# Patient Record
Sex: MALE | Race: BLACK OR AFRICAN AMERICAN | NOT HISPANIC OR LATINO | Employment: OTHER | URBAN - METROPOLITAN AREA
[De-identification: names, ages, dates, MRNs, and addresses within clinical notes are randomized per-mention and may not be internally consistent; named-entity substitution may affect disease eponyms.]

---

## 2018-09-26 ENCOUNTER — HISTORICAL (OUTPATIENT)
Dept: LAB | Facility: HOSPITAL | Age: 53
End: 2018-09-26

## 2018-09-26 ENCOUNTER — HISTORICAL (OUTPATIENT)
Dept: PREADMISSION TESTING | Facility: HOSPITAL | Age: 53
End: 2018-09-26

## 2018-09-26 LAB
ABS NEUT (OLG): 4.77 X10(3)/MCL (ref 2.1–9.2)
ALBUMIN SERPL-MCNC: 3.6 GM/DL (ref 3.4–5)
ALBUMIN/GLOB SERPL: 1.1 RATIO (ref 1.1–2)
ALP SERPL-CCNC: 62 UNIT/L (ref 50–136)
ALT SERPL-CCNC: 24 UNIT/L (ref 12–78)
ANISOCYTOSIS BLD QL SMEAR: 1
AST SERPL-CCNC: 20 UNIT/L (ref 15–37)
BASOPHILS # BLD AUTO: 0 X10(3)/MCL (ref 0–0.2)
BASOPHILS NFR BLD AUTO: 0 %
BILIRUB SERPL-MCNC: 0.6 MG/DL (ref 0.2–1)
BILIRUBIN DIRECT+TOT PNL SERPL-MCNC: 0.2 MG/DL (ref 0–0.5)
BILIRUBIN DIRECT+TOT PNL SERPL-MCNC: 0.4 MG/DL (ref 0–0.8)
BUN SERPL-MCNC: 16 MG/DL (ref 7–18)
BURR CELLS BLD QL SMEAR: 2
CALCIUM SERPL-MCNC: 8.7 MG/DL (ref 8.5–10.1)
CHLORIDE SERPL-SCNC: 104 MMOL/L (ref 98–107)
CO2 SERPL-SCNC: 30 MMOL/L (ref 21–32)
CREAT SERPL-MCNC: 1.29 MG/DL (ref 0.7–1.3)
EOSINOPHIL # BLD AUTO: 0.1 X10(3)/MCL (ref 0–0.9)
EOSINOPHIL NFR BLD AUTO: 2 %
ERYTHROCYTE [DISTWIDTH] IN BLOOD BY AUTOMATED COUNT: 14.9 % (ref 11.5–17)
GLOBULIN SER-MCNC: 3.2 GM/DL (ref 2.4–3.5)
GLUCOSE SERPL-MCNC: 41 MG/DL (ref 74–106)
HCT VFR BLD AUTO: 45.9 % (ref 42–52)
HGB BLD-MCNC: 13.7 GM/DL (ref 14–18)
LYMPHOCYTES # BLD AUTO: 0.8 X10(3)/MCL (ref 0.6–4.6)
LYMPHOCYTES NFR BLD AUTO: 11 %
MCH RBC QN AUTO: 25.8 PG (ref 27–31)
MCHC RBC AUTO-ENTMCNC: 29.8 GM/DL (ref 33–36)
MCV RBC AUTO: 86.3 FL (ref 80–94)
MICROCYTES BLD QL SMEAR: 1
MONOCYTES # BLD AUTO: 0.9 X10(3)/MCL (ref 0.1–1.3)
MONOCYTES NFR BLD AUTO: 14 %
NEUTROPHILS # BLD AUTO: 4.77 X10(3)/MCL (ref 2.1–9.2)
NEUTROPHILS NFR BLD AUTO: 69 %
PLATELET # BLD AUTO: 185 X10(3)/MCL (ref 130–400)
PLATELET # BLD EST: NORMAL 10*3/UL
PMV BLD AUTO: 10.5 FL (ref 9.4–12.4)
POIKILOCYTOSIS BLD QL SMEAR: 2
POTASSIUM SERPL-SCNC: 4.6 MMOL/L (ref 3.5–5.1)
PROT SERPL-MCNC: 6.8 GM/DL (ref 6.4–8.2)
RBC # BLD AUTO: 5.32 X10(6)/MCL (ref 4.7–6.1)
RBC MORPH BLD: NORMAL
SODIUM SERPL-SCNC: 140 MMOL/L (ref 136–145)
WBC # SPEC AUTO: 6.9 X10(3)/MCL (ref 4.5–11.5)

## 2018-10-03 ENCOUNTER — HISTORICAL (OUTPATIENT)
Dept: SURGERY | Facility: HOSPITAL | Age: 53
End: 2018-10-03

## 2021-05-12 ENCOUNTER — PATIENT MESSAGE (OUTPATIENT)
Dept: RESEARCH | Facility: HOSPITAL | Age: 56
End: 2021-05-12

## 2021-06-08 ENCOUNTER — HISTORICAL (OUTPATIENT)
Dept: ADMINISTRATIVE | Facility: HOSPITAL | Age: 56
End: 2021-06-08

## 2021-08-12 ENCOUNTER — HISTORICAL (OUTPATIENT)
Dept: ADMINISTRATIVE | Facility: HOSPITAL | Age: 56
End: 2021-08-12

## 2022-01-24 ENCOUNTER — HISTORICAL (OUTPATIENT)
Dept: INFECTIOUS DISEASES | Facility: HOSPITAL | Age: 57
End: 2022-01-24

## 2022-04-29 NOTE — OP NOTE
Patient:   Hank Coates            MRN: [id number]            FIN: [id number]               Age:   56 years     Sex:  Male     :  1965   Associated Diagnoses:   None   Author:   Kelsy ESPINOZA MD, Lizandro MARTINEZ      Pre-op Dx:  Cataract of the Right eye    Post-op Dx:  Cataract of the Right eye     Procedure:  Cataract extraction by phacoemulsification with an IOL    Anes:   General     Complications: None    Procedure in detail:   Dilating drops were given in the holding area.  The patient was brought into the surgical suite, identified and the correct eye confirmed.  Topical anesthesia was applied.  The eye was then prepped and draped in a sterile fashion.  A supersharp blade was used to make a paracentesis at the 11 oclock position. 1/2 cc of 2% lidocaine + 1/2 cc BSS was injected into AC thru cannula. Viscoelastic was placed in the anterior chamber.  A clear corneal incision was made at the 215 degree position with a keratome blade.  Next, a cystotome and utrata forceps were used to make a 360 degree capsulorrhexis.  The phaco handpiece was placed into the anterior chamber and the lens removed in a divide and conquer fashion.  The remaining cortex was removed with the I & A handpiece.  Viscoelastic was placed into the capsular bag, which remained clear and intact.  An  IOL was placed in the bag and rotated into position.  The remaining viscoelastic was removed with the I &A handpiece.  The incision was hydrated with BSS and checked for leakage.  No leakage was found.  The drapes were removed and antibiotic drops were placed into the eye.  The patient tolerated the procedure well and was moved back to the holding room.  Sunglasses and instructions were personally given to the patient and family.  The patient will follow-up at my office tomorrow.     EFX 19   24.5 ZCBOO IOL       Aniket Tierney II, M.D.

## 2022-04-29 NOTE — OP NOTE
Patient:   Hank Coates            MRN: [id number]            FIN: [id number]               Age:   56 years     Sex:  Male     :  1965   Associated Diagnoses:   None   Author:   Kelsy ESPINOZA MD, Lizandro MARTINEZ      Pre-op Dx:  Cataract of the Left eye    Post-op Dx:  Cataract of the Left eye     Procedure:  Cataract extraction by phacoemulsification with an IOL    Anes:   Topical    Complications: None    Procedure in detail:   Dilating drops were given in the holding area.  The patient was brought into the surgical suite, identified and the correct eye confirmed.  Topical anesthesia was applied.  The eye was then prepped and draped in a sterile fashion.  A supersharp blade was used to make a paracentesis at the 5 oclock position.  1/2 cc of 2% lidocaine + 1/2 cc BSS was injected into AC thru cannula. Viscoelastic was placed in the anterior chamber.  A clear corneal incision was made at the  3 oclock position with a keratome blade.  Next, a cystatome and utrata forceps were used to make and remove a 360 degree capsulorrhexis.  The phaco handpiece was placed into the anterior chamber and the lens removed in a divide and conquer fashion.  The remaining cortex was removed with the I & A handpiece.  Viscoelastic was placed into the capsular bag, which remained clear and intact.  An  IOL was placed in the bag and rotated into position.  The remaining viscoelastic was removed with the I &A handpiece.  The incision was hydrated with BSS and checked for leakage.  No leakage was found.  The drapes were removed and antibiotic drops were placed into the eye.  The patient tolerated the procedure well and was moved back to the holding room.  Sunglasses and instructions were personally given to the patient and family.  The patient will follow-up at my office tomorrow.      EFX 24    24.0 ZCBOO IOL        Aniket Tierney II, M.D.

## 2022-04-29 NOTE — OP NOTE
Patient:   Hank Coates            MRN: [id number]            FIN: [id number]               Age:   56 years     Sex:  Male     :  1965   Associated Diagnoses:   None   Author:   Kelsy ESPINOZA MD, Lizandro MARTINEZ      Pre-op Dx:  Cataract of the Right eye    Post-op Dx:  Cataract of the Right eye     Procedure:  Cataract extraction by phacoemulsification with an IOL    Anes:   Topical    Complications: None    Procedure in detail:   Dilating drops were given in the holding area.  The patient was brought into the surgical suite, identified and the correct eye confirmed.  Topical anesthesia was applied.  The eye was then prepped and draped in a sterile fashion.  A supersharp blade was used to make a paracentesis at the 11 oclock position. 1/2 cc of 2% lidocaine + 1/2 cc BSS was injected into AC thru cannula. Viscoelastic was placed in the anterior chamber.  A clear corneal incision was made at the 210 degrees position with a keratome blade.  Next, a cystotome and utrata forceps were used to make a 360 degree capsulorrhexis.  The phaco handpiece was placed into the anterior chamber and the lens removed in a divide and conquer fashion.  The remaining cortex was removed with the I & A handpiece.  Viscoelastic was placed into the capsular bag, which remained clear and intact.  An  IOL was placed in the bag and rotated into position.  The remaining viscoelastic was removed with the I &A handpiece.  The incision was hydrated with BSS and checked for leakage.  No leakage was found.  The drapes were removed and antibiotic drops were placed into the eye.  The patient tolerated the procedure well and was moved back to the holding room.  Sunglasses and instructions were personally given to the patient and family.  The patient will follow-up at my office tomorrow.     EFX 25   25.0 ZCBOO iol        Aniket Tierney II, M.D.

## 2022-04-29 NOTE — OP NOTE
Patient:   Hank Coates            MRN: [id number]            FIN: [id number]               Age:   53 years     Sex:  Male     :  1965   Associated Diagnoses:   Hernia, ventral; Hernia, ventral   Author:   Dru Patrick MD      Operative Note   Operative Information   Date/ Time:  10/3/2018 09:13:00.     Procedures Performed: Laparoscopic Ventral/Incisional Hernia repair with mesh.     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     Postoperative Diagnosis: Hernia, ventral (ZLJ85-RG K43.9).     Surgeon: Dru Patrick MD.     Assistant: Josefina Jim.     Anesthesia: GETA.     Description of Procedure/Findings/    Complications: Patient was taken to the OR.  The abdomen was prepped and draped in the usual sterile fashion after successfel general anesthetic administered.  An optical tipped trocar was used to access the peritoneal cavity.  The abdomen was explored and additional working ports were placed under direct vision.  The hernia was noted and measured.  The contents were reduced.  A portion of parietex mesh was placed intraperitoneally to cover the defect with a 3 cm overlap circumferentially.  This was fixed with combination of trans fascial permanent suture and spiral tacks.  The remainder of the exploration revealed no other pathology.  The trocars were removed.  The incisions closed with Vicryl.  .     Esimated blood loss: No blood loss.     Complications: None.